# Patient Record
Sex: FEMALE | Race: WHITE | NOT HISPANIC OR LATINO | Employment: OTHER | URBAN - METROPOLITAN AREA
[De-identification: names, ages, dates, MRNs, and addresses within clinical notes are randomized per-mention and may not be internally consistent; named-entity substitution may affect disease eponyms.]

---

## 2018-02-10 ENCOUNTER — APPOINTMENT (OUTPATIENT)
Dept: RADIOLOGY | Facility: HOSPITAL | Age: 68
End: 2018-02-10
Attending: NURSE PRACTITIONER
Payer: MEDICARE

## 2018-02-10 ENCOUNTER — OFFICE VISIT (OUTPATIENT)
Dept: URGENT CARE | Facility: CLINIC | Age: 68
End: 2018-02-10
Payer: MEDICARE

## 2018-02-10 ENCOUNTER — TELEPHONE (OUTPATIENT)
Dept: URGENT CARE | Facility: CLINIC | Age: 68
End: 2018-02-10

## 2018-02-10 VITALS
DIASTOLIC BLOOD PRESSURE: 63 MMHG | TEMPERATURE: 98 F | HEART RATE: 63 BPM | BODY MASS INDEX: 37.37 KG/M2 | HEIGHT: 67 IN | OXYGEN SATURATION: 99 % | SYSTOLIC BLOOD PRESSURE: 137 MMHG | WEIGHT: 238.13 LBS

## 2018-02-10 DIAGNOSIS — M25.531 RIGHT WRIST PAIN: ICD-10-CM

## 2018-02-10 DIAGNOSIS — M25.531 RIGHT WRIST PAIN: Primary | ICD-10-CM

## 2018-02-10 PROCEDURE — 73110 X-RAY EXAM OF WRIST: CPT | Mod: 26,RT,, | Performed by: RADIOLOGY

## 2018-02-10 PROCEDURE — 99203 OFFICE O/P NEW LOW 30 MIN: CPT | Mod: PBBFAC,25,PN

## 2018-02-10 PROCEDURE — 73110 X-RAY EXAM OF WRIST: CPT | Mod: TC,PO,RT

## 2018-02-10 PROCEDURE — 73130 X-RAY EXAM OF HAND: CPT | Mod: 26,RT,, | Performed by: RADIOLOGY

## 2018-02-10 PROCEDURE — 1159F MED LIST DOCD IN RCRD: CPT | Mod: ,,, | Performed by: FAMILY MEDICINE

## 2018-02-10 PROCEDURE — 99999 PR PBB SHADOW E&M-NEW PATIENT-LVL III: CPT | Mod: PBBFAC,,,

## 2018-02-10 PROCEDURE — 99214 OFFICE O/P EST MOD 30 MIN: CPT | Mod: S$PBB,,, | Performed by: FAMILY MEDICINE

## 2018-02-10 PROCEDURE — 96372 THER/PROPH/DIAG INJ SC/IM: CPT | Mod: PBBFAC,PN

## 2018-02-10 PROCEDURE — 73130 X-RAY EXAM OF HAND: CPT | Mod: TC,PO,RT

## 2018-02-10 RX ORDER — PROPRANOLOL HYDROCHLORIDE 80 MG/1
80 CAPSULE, EXTENDED RELEASE ORAL DAILY
COMMUNITY

## 2018-02-10 RX ORDER — FLUOXETINE HYDROCHLORIDE 20 MG/1
20 CAPSULE ORAL DAILY
COMMUNITY

## 2018-02-10 RX ORDER — KETOROLAC TROMETHAMINE 10 MG/1
10 TABLET, FILM COATED ORAL EVERY 6 HOURS PRN
Qty: 16 TABLET | Refills: 0 | Status: SHIPPED | OUTPATIENT
Start: 2018-02-10 | End: 2018-02-14

## 2018-02-10 RX ORDER — KETOROLAC TROMETHAMINE 30 MG/ML
15 INJECTION, SOLUTION INTRAMUSCULAR; INTRAVENOUS
Status: DISCONTINUED | OUTPATIENT
Start: 2018-02-10 | End: 2018-02-10

## 2018-02-10 RX ORDER — KETOROLAC TROMETHAMINE 30 MG/ML
30 INJECTION, SOLUTION INTRAMUSCULAR; INTRAVENOUS
Status: COMPLETED | OUTPATIENT
Start: 2018-02-10 | End: 2018-02-10

## 2018-02-10 RX ADMIN — KETOROLAC TROMETHAMINE 30 MG: 30 INJECTION, SOLUTION INTRAMUSCULAR at 10:02

## 2018-02-10 NOTE — PROGRESS NOTES
"Subjective:       Patient ID: Sanam Rodriguez is a 67 y.o. female.    Chief Complaint: Wrist Pain    Patient is presenting with an acute onset ( 3 days) of right wrist pain.Denies an injury to area. OTC meds and ice did not resolve her pain. No decrease in ROM.      Review of Systems   Constitutional: Positive for activity change.   Cardiovascular: Negative.    Gastrointestinal: Negative.    Musculoskeletal: Positive for joint swelling.        Right wrist pain   Skin: Negative.    Psychiatric/Behavioral: Negative.        Objective:      /63   Pulse 63   Temp 98 °F (36.7 °C) (Tympanic)   Ht 5' 7" (1.702 m)   Wt 108 kg (238 lb 1.6 oz)   SpO2 99%   BMI 37.29 kg/m²   Physical Exam   Constitutional: She appears well-developed and well-nourished. She appears distressed.   Cardiovascular: Normal rate, regular rhythm, normal heart sounds and intact distal pulses.    Pulmonary/Chest: Effort normal and breath sounds normal.   Musculoskeletal:   Right wrist slight edema. Full ROM.    Neurological: She is alert.   Skin: Skin is warm and dry. Capillary refill takes less than 2 seconds.   Psychiatric: She has a normal mood and affect. Her behavior is normal.   Nursing note and vitals reviewed.      Assessment:       1. Right wrist pain        Plan:       Right wrist pain  -     X-Ray Hand 3 View Right; Future; Expected date: 02/10/2018  -     X-Ray Wrist Complete Right; Future; Expected date: 02/10/2018    Other orders  -     ketorolac (TORADOL) 10 mg tablet; Take 1 tablet (10 mg total) by mouth every 6 (six) hours as needed for Pain.  Dispense: 16 tablet; Refill: 0  -     Discontinue: ketorolac injection 15 mg; Inject 15 mg into the vein one time.  -     ketorolac injection 30 mg; Inject 1 mL (30 mg total) into the muscle one time.    X-rays did not indicate FX. Will sent for [facility]-Rad.  Right wrist demobilized   Patient to f/u with PCP or Ortho if no resolve by Monday  "

## 2018-07-05 ENCOUNTER — OFFICE VISIT (OUTPATIENT)
Dept: INTERNAL MEDICINE | Facility: CLINIC | Age: 68
End: 2018-07-05
Payer: MEDICARE

## 2018-07-05 VITALS
WEIGHT: 237.44 LBS | HEART RATE: 82 BPM | OXYGEN SATURATION: 98 % | HEIGHT: 67 IN | DIASTOLIC BLOOD PRESSURE: 56 MMHG | TEMPERATURE: 98 F | BODY MASS INDEX: 37.27 KG/M2 | SYSTOLIC BLOOD PRESSURE: 121 MMHG | RESPIRATION RATE: 18 BRPM

## 2018-07-05 DIAGNOSIS — H65.02 ACUTE SEROUS OTITIS MEDIA OF LEFT EAR, RECURRENCE NOT SPECIFIED: Primary | ICD-10-CM

## 2018-07-05 DIAGNOSIS — H60.322 ACUTE HEMORRHAGIC OTITIS EXTERNA OF LEFT EAR: ICD-10-CM

## 2018-07-05 PROCEDURE — 99213 OFFICE O/P EST LOW 20 MIN: CPT | Mod: PBBFAC,PN | Performed by: INTERNAL MEDICINE

## 2018-07-05 PROCEDURE — 99999 PR PBB SHADOW E&M-EST. PATIENT-LVL III: CPT | Mod: PBBFAC,,, | Performed by: INTERNAL MEDICINE

## 2018-07-05 PROCEDURE — 99213 OFFICE O/P EST LOW 20 MIN: CPT | Mod: S$PBB,,, | Performed by: INTERNAL MEDICINE

## 2018-07-05 RX ORDER — AMOXICILLIN 500 MG/1
500 TABLET, FILM COATED ORAL EVERY 8 HOURS
Qty: 21 TABLET | Refills: 0 | Status: SHIPPED | OUTPATIENT
Start: 2018-07-05 | End: 2018-07-12

## 2018-07-05 RX ORDER — NEOMYCIN SULFATE, POLYMYXIN B SULFATE AND HYDROCORTISONE 10; 3.5; 1 MG/ML; MG/ML; [USP'U]/ML
3 SUSPENSION/ DROPS AURICULAR (OTIC) 3 TIMES DAILY
Qty: 6 ML | Refills: 0 | Status: SHIPPED | OUTPATIENT
Start: 2018-07-05 | End: 2018-07-15

## 2018-07-05 NOTE — PROGRESS NOTES
Subjective:      Patient ID: Sanam Rodriguez is a 68 y.o. female.    Chief Complaint: No chief complaint on file.      HPI     Ms. Sanam Rodriguez is a patient of Mark Webster MD, who presents for left ear pain over the past 3 days and sore throat for the past 2 days. No cough. No sneezing. No f/c. +left sided facial pain. No tooth pain. She tried 2 Aleve this morning, which usually helps, but didn't in this case. Of note, she mentioned having accidentally scratched the left ear canal by accident last Friday. She applied Bactroban twice since.      Past Medical History:   Diagnosis Date    Hypertension      Past Surgical History:   Procedure Laterality Date    HYSTERECTOMY       Social History     Social History    Marital status: Unknown     Spouse name: N/A    Number of children: N/A    Years of education: N/A     Occupational History    Not on file.     Social History Main Topics    Smoking status: Never Smoker    Smokeless tobacco: Not on file    Alcohol use Not on file    Drug use: Unknown    Sexual activity: Not on file     Other Topics Concern    Not on file     Social History Narrative    No narrative on file     History reviewed. No pertinent family history.    Current Outpatient Prescriptions:     acetaminophen (TYLENOL) 500 MG tablet, Take 2 tablets (1,000 mg total) by mouth 3 (three) times daily as needed for Pain., Disp: 30 tablet, Rfl: 0    FLUoxetine (PROZAC) 20 MG capsule, Take 20 mg by mouth once daily., Disp: , Rfl:     ibuprofen (ADVIL,MOTRIN) 200 MG tablet, Take 2 tablets (400 mg total) by mouth every 6 (six) hours as needed for Pain., Disp: 30 tablet, Rfl: 0    propranolol (INDERAL LA) 80 MG 24 hr capsule, Take 80 mg by mouth once daily., Disp: , Rfl:     amoxicillin (AMOXIL) 500 MG Tab, Take 1 tablet (500 mg total) by mouth every 8 (eight) hours. for 7 days, Disp: 21 tablet, Rfl: 0    hydrocodone-acetaminophen 5-325mg (NORCO) 5-325 mg per tablet, Take 1 tablet by mouth  "every 4 (four) hours as needed for Pain., Disp: 10 tablet, Rfl: 0    neomycin-polymyxin-hydrocortisone (CORTISPORIN) 3.5-10,000-1 mg/mL-unit/mL-% otic suspension, Place 3 drops into the left ear 3 (three) times daily. for 10 days, Disp: 6 mL, Rfl: 0    Review of patient's allergies indicates:   Allergen Reactions    Sulfa (sulfonamide antibiotics)         Review of Systems   Negative    Objective:     BP (!) 121/56 (BP Location: Left arm, Patient Position: Sitting, BP Method: Large (Automatic))   Pulse 82   Temp 98 °F (36.7 °C) (Tympanic)   Resp 18   Ht 5' 7" (1.702 m)   Wt 107.7 kg (237 lb 7 oz)   SpO2 98%   BMI 37.19 kg/m²     Physical Exam  GEN: A&O fully, NAD  PSYC: Normal affect  HEENT: OP: left posterior pharynx erythematous, +shotty LAD in left submandibular region, no thyroid masses; R auditory canal & TM WNL; L auditory canal with scab and minor pus, left TM with opacification of inferior portion      Assessment:      1. Acute serous otitis media of left ear, recurrence not specified    2. Acute hemorrhagic otitis externa of left ear        Plan:   Acute serous otitis media of left ear, recurrence not specified  -     amoxicillin (AMOXIL) 500 MG Tab; Take 1 tablet (500 mg total) by mouth every 8 (eight) hours. for 7 days  Dispense: 21 tablet; Refill: 0    Acute hemorrhagic otitis externa of left ear  -     neomycin-polymyxin-hydrocortisone (CORTISPORIN) 3.5-10,000-1 mg/mL-unit/mL-% otic suspension; Place 3 drops into the left ear 3 (three) times daily. for 10 days  Dispense: 6 mL; Refill: 0        No Follow-up on file.  "

## 2025-04-02 ENCOUNTER — OFFICE VISIT (OUTPATIENT)
Dept: UROLOGY | Facility: CLINIC | Age: 75
End: 2025-04-02
Payer: MEDICARE

## 2025-04-02 ENCOUNTER — LAB VISIT (OUTPATIENT)
Dept: LAB | Facility: HOSPITAL | Age: 75
End: 2025-04-02
Attending: UROLOGY
Payer: MEDICARE

## 2025-04-02 VITALS
HEART RATE: 62 BPM | HEIGHT: 66 IN | SYSTOLIC BLOOD PRESSURE: 117 MMHG | WEIGHT: 249.31 LBS | DIASTOLIC BLOOD PRESSURE: 72 MMHG | BODY MASS INDEX: 40.07 KG/M2

## 2025-04-02 DIAGNOSIS — R31.29 OTHER MICROSCOPIC HEMATURIA: ICD-10-CM

## 2025-04-02 DIAGNOSIS — R10.9 RIGHT FLANK PAIN: ICD-10-CM

## 2025-04-02 DIAGNOSIS — R35.1 NOCTURIA: ICD-10-CM

## 2025-04-02 DIAGNOSIS — Z80.51 FAMILY HISTORY OF KIDNEY CANCER: ICD-10-CM

## 2025-04-02 DIAGNOSIS — R31.29 OTHER MICROSCOPIC HEMATURIA: Primary | ICD-10-CM

## 2025-04-02 DIAGNOSIS — N20.0 KIDNEY STONE: ICD-10-CM

## 2025-04-02 LAB
BILIRUB UR QL STRIP: NEGATIVE
CREAT SERPL-MCNC: 1.2 MG/DL (ref 0.5–1.4)
GFR SERPLBLD CREATININE-BSD FMLA CKD-EPI: 47 ML/MIN/1.73/M2
GLUCOSE UR QL STRIP: NEGATIVE
KETONES UR QL STRIP: NEGATIVE
LEUKOCYTE ESTERASE UR QL STRIP: NEGATIVE
PH, POC UA: 5.5
POC BLOOD, URINE: POSITIVE
POC NITRATES, URINE: NEGATIVE
POC RESIDUAL URINE VOLUME: 12 ML (ref 0–100)
PROT UR QL STRIP: POSITIVE
SP GR UR STRIP: 1.03 (ref 1–1.03)
UROBILINOGEN UR STRIP-ACNC: 0.2 (ref 0.1–1.1)

## 2025-04-02 PROCEDURE — 36415 COLL VENOUS BLD VENIPUNCTURE: CPT

## 2025-04-02 PROCEDURE — 82565 ASSAY OF CREATININE: CPT

## 2025-04-02 PROCEDURE — 99999 PR PBB SHADOW E&M-NEW PATIENT-LVL III: CPT | Mod: PBBFAC,,, | Performed by: UROLOGY

## 2025-04-02 RX ORDER — METFORMIN HYDROCHLORIDE 500 MG/1
1 TABLET ORAL
COMMUNITY

## 2025-04-02 RX ORDER — CARVEDILOL 6.25 MG/1
1 TABLET ORAL 2 TIMES DAILY
COMMUNITY

## 2025-04-02 RX ORDER — NAPROXEN SODIUM 220 MG/1
TABLET, FILM COATED ORAL
COMMUNITY

## 2025-04-02 RX ORDER — ROSUVASTATIN CALCIUM 20 MG/1
20 TABLET, COATED ORAL
COMMUNITY

## 2025-04-02 RX ORDER — SEMAGLUTIDE 0.68 MG/ML
0.5 INJECTION, SOLUTION SUBCUTANEOUS
COMMUNITY

## 2025-04-02 NOTE — PROGRESS NOTES
"Subjective:       Patient ID: Sanam Rodriguez is a 74 y.o. female.    Chief Complaint: No chief complaint on file.      History of Present Illness:     Ms Rodriguez has a small amount of microscopic hematuria today on 4-2-25.  No gross hematuria.  No dysuria.  Nocturia X 2-3.  She is not a smoker and she has no smoking history.  She takes aspirin 81 mg daily.  She had mild intermittent right flank pain for a few days last week that has resolved.  No left flank pain.  She has a left sided kidney stone.  She is a diabetic and she is on ozempic and metformin. She has a family history of kidney cancer in her sister.           Past Medical History:   Diagnosis Date    Hypertension      No family history on file.  Social History[1]  Encounter Medications[2]     Review of Systems   Constitutional:  Negative for chills and fever.   Respiratory:  Negative for shortness of breath.    Cardiovascular:  Negative for chest pain.   Gastrointestinal:  Negative for nausea and vomiting.   Genitourinary:  Positive for flank pain. Negative for difficulty urinating, dysuria and hematuria.   Musculoskeletal:  Negative for back pain.   Skin:  Negative for rash.   Neurological:  Negative for dizziness.   Psychiatric/Behavioral:  Negative for agitation.        Objective:     /72   Pulse 62   Ht 5' 5.75" (1.67 m)   Wt 113.1 kg (249 lb 5.4 oz)   BMI 40.55 kg/m²     Physical Exam  Constitutional:       General: She is not in acute distress.  Pulmonary:      Effort: Pulmonary effort is normal.   Abdominal:      General: There is no distension.      Palpations: Abdomen is soft.   Neurological:      Mental Status: She is alert and oriented to person, place, and time.         Office Visit on 04/02/2025   Component Date Value Ref Range Status    POC Residual Urine Volume 04/02/2025 12  0 - 100 mL Final    POC Blood, Urine 04/02/2025 Positive (A)  Negative Final    POC Bilirubin, Urine 04/02/2025 Negative  Negative Final    POC Urobilinogen, " Urine 04/02/2025 0.2  0.1 - 1.1 Final    POC Ketones, Urine 04/02/2025 Negative  Negative Final    POC Protein, Urine 04/02/2025 Positive (A)  Negative Final    POC Nitrates, Urine 04/02/2025 Negative  Negative Final    POC Glucose, Urine 04/02/2025 Negative  Negative Final    pH, UA 04/02/2025 5.5   Final    POC Specific Gravity, Urine 04/02/2025 1.030 (A)  1.003 - 1.029 Final    POC Leukocytes, Urine 04/02/2025 Negative  Negative Final        Results for orders placed or performed in visit on 04/02/25 (from the past 8760 hours)   POCT Bladder Scan   Result Value    POC Residual Urine Volume 12        Assessment:       1. Other microscopic hematuria    2. Kidney stone    3. Right flank pain    4. Nocturia    5. Family history of kidney cancer        Plan:     Orders Placed This Encounter    CT Urogram Abd Pelvis W WO    Creatinine, Serum    POCT Bladder Scan    POCT Urinalysis, Dipstick, Automated, W/O Scope          Assessment:  - Small amount of microscopic hematuria today on 4-2-25.  She is not a smoker and she has no smoking history.  She takes aspirin 81 mg daily.  - Mild intermittent right flank pain for a few days last week that has resolved.  - Left sided kidney stone.  - Nocturia.  - DM.  She is on ozempic and metformin.  - Family history of kidney cancer in her sister.    - CAD.  She had a coronary stent placed in 2021.    Plan:  - CT urogram followed by an office visit.  - Creatinine today.  - I discussed dietary modifications with her today and I recommended she drink mostly water during the day.  - I recommended she limit her fluid intake at night to small amounts of water and to void just prior to bedtime.                           [1]   Social History  Socioeconomic History    Marital status: Unknown   Tobacco Use    Smoking status: Never   Substance and Sexual Activity    Alcohol use: Not Currently    Drug use: Never    Sexual activity: Not Currently     Social Drivers of Health     Financial  Resource Strain: Patient Declined (2/13/2025)    Received from [facility]    Overall Financial Resource Strain (CARDIA)     Difficulty of Paying Living Expenses: Patient declined   Food Insecurity: Patient Declined (2/13/2025)    Received from [facility]    Hunger Vital Sign     Worried About Running Out of Food in the Last Year: Patient declined     Ran Out of Food in the Last Year: Patient declined   Transportation Needs: Patient Declined (2/13/2025)    Received from [facility]    PRAPARE - Transportation     Lack of Transportation (Medical): Patient declined     Lack of Transportation (Non-Medical): Patient declined   Physical Activity: Patient Declined (2/13/2025)    Received from [facility]    Exercise Vital Sign     Days of Exercise per Week: Patient declined     Minutes of Exercise per Session: Patient declined   Stress: Patient Declined (2/13/2025)    Received from [facility]    [employer] - Occupational Stress Questionnaire     Feeling of Stress : Patient declined   Housing Stability: Patient Declined (2/13/2025)    Received from [facility]    Housing Stability Vital Sign     Unable to Pay for Housing in the Last Year: Patient declined     Homeless in the Last Year: Patient declined   [2]   Outpatient Encounter Medications as of 4/2/2025   Medication Sig Dispense Refill    aspirin 81 MG Chew Take by mouth.      carvediloL (COREG) 6.25 MG tablet Take 1 tablet by mouth 2 (two) times daily.      FLUoxetine (PROZAC) 20 MG capsule Take 20 mg by mouth once daily.      metFORMIN (GLUCOPHAGE) 500 MG tablet Take 1 tablet by mouth.      OZEMPIC 0.25 mg or 0.5 mg (2 mg/3 mL) pen injector Inject 0.5 mg into the skin every 7 days.      propranolol (INDERAL LA) 80 MG 24 hr capsule Take 80 mg by mouth once daily.      rosuvastatin (CRESTOR) 20 MG tablet Take 20 mg by mouth.      acetaminophen (TYLENOL) 500 MG tablet Take 2 tablets (1,000 mg total) by mouth  3 (three) times daily as needed for Pain. 30 tablet 0    hydrocodone-acetaminophen 5-325mg (NORCO) 5-325 mg per tablet Take 1 tablet by mouth every 4 (four) hours as needed for Pain. 10 tablet 0    ibuprofen (ADVIL,MOTRIN) 200 MG tablet Take 2 tablets (400 mg total) by mouth every 6 (six) hours as needed for Pain. 30 tablet 0     No facility-administered encounter medications on file as of 4/2/2025.

## 2025-04-03 ENCOUNTER — RESULTS FOLLOW-UP (OUTPATIENT)
Dept: UROLOGY | Facility: CLINIC | Age: 75
End: 2025-04-03

## 2025-04-03 ENCOUNTER — TELEPHONE (OUTPATIENT)
Dept: UROLOGY | Facility: CLINIC | Age: 75
End: 2025-04-03
Payer: MEDICARE

## 2025-04-03 NOTE — TELEPHONE ENCOUNTER
I called pt and informed her that Dr. Lyles reviewed her GFR (which is a measurement of her kidney function) and it was low at 47 (60 or higher is considered normal). Encouraged her to drink mostly water. CT scan was moved up as per MD request.

## 2025-04-15 ENCOUNTER — PATIENT MESSAGE (OUTPATIENT)
Dept: NEUROLOGY | Facility: CLINIC | Age: 75
End: 2025-04-15
Payer: MEDICARE

## 2025-04-16 ENCOUNTER — HOSPITAL ENCOUNTER (OUTPATIENT)
Dept: RADIOLOGY | Facility: HOSPITAL | Age: 75
Discharge: HOME | End: 2025-04-16
Attending: UROLOGY
Payer: MEDICARE

## 2025-04-16 DIAGNOSIS — N20.0 KIDNEY STONE: ICD-10-CM

## 2025-04-16 DIAGNOSIS — R31.29 OTHER MICROSCOPIC HEMATURIA: ICD-10-CM

## 2025-04-16 DIAGNOSIS — R10.9 RIGHT FLANK PAIN: ICD-10-CM

## 2025-04-16 PROCEDURE — 25500020 PHARM REV CODE 255: Performed by: UROLOGY

## 2025-04-16 PROCEDURE — 74178 CT ABD&PLV WO CNTR FLWD CNTR: CPT | Mod: TC

## 2025-04-16 PROCEDURE — 74178 CT ABD&PLV WO CNTR FLWD CNTR: CPT | Mod: 26,,, | Performed by: RADIOLOGY

## 2025-04-16 RX ADMIN — IOHEXOL 75 ML: 350 INJECTION, SOLUTION INTRAVENOUS at 10:04

## 2025-04-24 ENCOUNTER — RESULTS FOLLOW-UP (OUTPATIENT)
Dept: UROLOGY | Facility: CLINIC | Age: 75
End: 2025-04-24

## 2025-04-24 ENCOUNTER — TELEPHONE (OUTPATIENT)
Dept: UROLOGY | Facility: CLINIC | Age: 75
End: 2025-04-24
Payer: MEDICARE

## 2025-04-24 ENCOUNTER — HOSPITAL ENCOUNTER (OUTPATIENT)
Dept: CARDIOLOGY | Facility: HOSPITAL | Age: 75
Discharge: HOME | End: 2025-04-24
Attending: UROLOGY
Payer: MEDICARE

## 2025-04-24 ENCOUNTER — HOSPITAL ENCOUNTER (OUTPATIENT)
Dept: RADIOLOGY | Facility: HOSPITAL | Age: 75
Discharge: HOME | End: 2025-04-24
Attending: UROLOGY
Payer: MEDICARE

## 2025-04-24 ENCOUNTER — OFFICE VISIT (OUTPATIENT)
Dept: UROLOGY | Facility: CLINIC | Age: 75
End: 2025-04-24
Payer: MEDICARE

## 2025-04-24 VITALS
HEIGHT: 66 IN | SYSTOLIC BLOOD PRESSURE: 139 MMHG | BODY MASS INDEX: 40.04 KG/M2 | HEART RATE: 69 BPM | WEIGHT: 249.13 LBS | DIASTOLIC BLOOD PRESSURE: 81 MMHG

## 2025-04-24 DIAGNOSIS — Z01.818 PREOP TESTING: ICD-10-CM

## 2025-04-24 DIAGNOSIS — N18.31 CHRONIC KIDNEY DISEASE (CKD) STAGE G3A/A1, MODERATELY DECREASED GLOMERULAR FILTRATION RATE (GFR) BETWEEN 45-59 ML/MIN/1.73 SQUARE METER AND ALBUMINURIA CREATININE RATIO LESS THAN 30 MG/G: ICD-10-CM

## 2025-04-24 DIAGNOSIS — N20.0 RENAL STONE: Primary | ICD-10-CM

## 2025-04-24 DIAGNOSIS — N20.0 RENAL STONE: ICD-10-CM

## 2025-04-24 DIAGNOSIS — R91.8 PULMONARY NODULES: ICD-10-CM

## 2025-04-24 DIAGNOSIS — R31.29 OTHER MICROSCOPIC HEMATURIA: ICD-10-CM

## 2025-04-24 LAB
BILIRUBIN, UA POC OHS: NEGATIVE
BLOOD, UA POC OHS: ABNORMAL
CLARITY, UA POC OHS: CLEAR
COLOR, UA POC OHS: YELLOW
GLUCOSE, UA POC OHS: NEGATIVE
KETONES, UA POC OHS: NEGATIVE
LEUKOCYTES, UA POC OHS: ABNORMAL
NITRITE, UA POC OHS: NEGATIVE
OHS QRS DURATION: 94 MS
OHS QTC CALCULATION: 442 MS
PH, UA POC OHS: 5.5
PROTEIN, UA POC OHS: 100
SPECIFIC GRAVITY, UA POC OHS: 1.02
UROBILINOGEN, UA POC OHS: 0.2

## 2025-04-24 PROCEDURE — 99999 PR PBB SHADOW E&M-EST. PATIENT-LVL V: CPT | Mod: PBBFAC,,, | Performed by: UROLOGY

## 2025-04-24 PROCEDURE — 71046 X-RAY EXAM CHEST 2 VIEWS: CPT | Mod: 26,,, | Performed by: STUDENT IN AN ORGANIZED HEALTH CARE EDUCATION/TRAINING PROGRAM

## 2025-04-24 PROCEDURE — 74018 RADEX ABDOMEN 1 VIEW: CPT | Mod: TC

## 2025-04-24 PROCEDURE — 74018 RADEX ABDOMEN 1 VIEW: CPT | Mod: 26,,, | Performed by: STUDENT IN AN ORGANIZED HEALTH CARE EDUCATION/TRAINING PROGRAM

## 2025-04-24 PROCEDURE — 93005 ELECTROCARDIOGRAM TRACING: CPT

## 2025-04-24 PROCEDURE — 71046 X-RAY EXAM CHEST 2 VIEWS: CPT | Mod: TC

## 2025-04-24 PROCEDURE — 93010 ELECTROCARDIOGRAM REPORT: CPT | Mod: ,,, | Performed by: INTERNAL MEDICINE

## 2025-04-24 NOTE — H&P (VIEW-ONLY)
Subjective:       Patient ID: Sanam Rodriguez is a 74 y.o. female.    Chief Complaint: Follow-up      History of Present Illness:     Ms Rodriguez has persistent microscopic hematuria.  Small amount of microscopic hematuria on 4-2-25.  Large amount of MH today on 4-24-25.  No gross hematuria.  Nocturia X 2-3.  Occasional daytime frequency and urgency.  She is not a smoker and she has no smoking history.  She takes aspirin 81 mg daily.  No current flank pain.    She underwent a CT urogram on 4-16-25 that showed CT urogram on 4-16-25 showed:  Kidneys/bladder: Punctate nonobstructive right renal calculi. Punctate lower pole left renal calculus with 18 mm staghorn like calculus within the mid to inferior renal pelvis. No significant hydronephrosis. Mild right renal pelvis and proximal ureter urothelial thickening and adjacent fat stranding. No significant perinephric stranding. More distal ureters demonstrate no evidence of urolithiasis, persistent filling defect or wall thickening. Bladder demonstrates circumferential wall thickening accentuated by incomplete distension.  Pancreas: Ill-defined cystic area at the dorsal pancreatic head measure approximate 15 mm.  Dedicated MRI abdomen/MRCP recommended. Innumerable small pulmonary nodules. Consider diagnostic CT chest for complete evaluation. CT urogram on 4-16-25 showed:  Kidneys/bladder: Punctate nonobstructive right renal calculi. Punctate lower pole left renal calculus with 18 mm staghorn like calculus within the mid to inferior renal pelvis. No significant hydronephrosis. Mild right renal pelvis and proximal ureter urothelial thickening and adjacent fat stranding. No significant perinephric stranding. More distal ureters demonstrate no evidence of urolithiasis, persistent filling defect or wall thickening. Bladder demonstrates circumferential wall thickening accentuated by incomplete distension.  Pancreas: Ill-defined cystic area at the dorsal pancreatic head measure  "approximate 15 mm.  Dedicated MRI abdomen/MRCP recommended. Innumerable small pulmonary nodules. Consider diagnostic CT chest for complete evaluation.          Past Medical History:   Diagnosis Date    Hypertension      No family history on file.  Social History[1]  Encounter Medications[2]     Review of Systems   Constitutional:  Negative for chills and fever.   Respiratory:  Negative for shortness of breath.    Cardiovascular:  Negative for chest pain.   Gastrointestinal:  Negative for nausea and vomiting.   Genitourinary:  Negative for difficulty urinating, dysuria, flank pain and hematuria.   Musculoskeletal:  Negative for back pain.   Skin:  Negative for rash.   Neurological:  Negative for dizziness.   Psychiatric/Behavioral:  Negative for agitation.        Objective:     /81   Pulse 69   Ht 5' 5.75" (1.67 m)   Wt 113 kg (249 lb 1.9 oz)   BMI 40.52 kg/m²     Physical Exam  Constitutional:       General: She is not in acute distress.  Cardiovascular:      Rate and Rhythm: Normal rate.   Pulmonary:      Effort: Pulmonary effort is normal.   Abdominal:      General: There is no distension.      Palpations: Abdomen is soft.   Neurological:      Mental Status: She is alert and oriented to person, place, and time.         Office Visit on 04/24/2025   Component Date Value Ref Range Status    Color, POC UA 04/24/2025 Yellow  Yellow, Straw, Colorless Final    Clarity, POC UA 04/24/2025 Clear  Clear Final    Glucose, POC UA 04/24/2025 Negative  Negative Final    Bilirubin, POC UA 04/24/2025 Negative  Negative Final    Ketones, POC UA 04/24/2025 Negative  Negative Final    Spec Grav POC UA 04/24/2025 1.025  1.005 - 1.030 Final    Blood, POC UA 04/24/2025 Large (A)  Negative Final    pH, POC UA 04/24/2025 5.5  5.0 - 8.0 Final    Protein, POC UA 04/24/2025 100 (A)  Negative Final    Urobilinogen, POC UA 04/24/2025 0.2  <=1.0 Final    Nitrite, POC UA 04/24/2025 Negative  Negative Final    WBC, POC UA 04/24/2025 " Small (A)  Negative Final        Results for orders placed or performed in visit on 04/02/25 (from the past 8760 hours)   POCT Bladder Scan   Result Value    POC Residual Urine Volume 12        Assessment:       1. Renal stone    2. Other microscopic hematuria    3. Pulmonary nodules    4. Chronic kidney disease (CKD) stage G3a/A1, moderately decreased glomerular filtration rate (GFR) between 45-59 mL/min/1.73 square meter and albuminuria creatinine ratio less than 30 mg/g    5. Preop testing        Plan:     Orders Placed This Encounter    X-Ray Chest PA And Lateral    X-Ray KUB    Comprehensive Metabolic Panel    CBC Auto Differential    POCT Urinalysis(Instrument)    EKG 12-lead        4-16-25  CT urogram.  See report.  Kidneys/bladder: Punctate nonobstructive right renal calculi. Punctate lower pole left renal calculus with 18 mm staghorn like calculus within the mid to inferior renal pelvis. No significant hydronephrosis. Mild right renal pelvis and proximal ureter urothelial thickening and adjacent fat stranding. No significant perinephric stranding. More distal ureters demonstrate no evidence of urolithiasis, persistent filling defect or wall thickening. Bladder demonstrates circumferential wall thickening accentuated by incomplete distension.  Pancreas: Ill-defined cystic area at the dorsal pancreatic head measure approximate 15 mm.  Dedicated MRI abdomen/MRCP recommended. Innumerable small pulmonary nodules. Consider diagnostic CT chest for complete evaluation.     I reviewed the above imaging results.         4-2-25  Cr 1.2.  GFR 47.    I reviewed the above lab results.         Assessment:  - CT urogram on 4-16-25 showed:  Kidneys/bladder: Punctate nonobstructive right renal calculi. Punctate lower pole left renal calculus with 18 mm staghorn like calculus within the mid to inferior renal pelvis. No significant hydronephrosis. Mild right renal pelvis and proximal ureter urothelial thickening and adjacent  fat stranding. No significant perinephric stranding. More distal ureters demonstrate no evidence of urolithiasis, persistent filling defect or wall thickening. Bladder demonstrates circumferential wall thickening accentuated by incomplete distension.  Pancreas: Ill-defined cystic area at the dorsal pancreatic head measure approximate 15 mm.  Dedicated MRI abdomen/MRCP recommended. Innumerable small pulmonary nodules. Consider diagnostic CT chest for complete evaluation.   - Persistent microscopic hematuria.  Small amount of microscopic hematuria on 4-2-25.  Large amount of MH today on 4-24-25.  She is not a smoker and she has no smoking history.  She takes aspirin 81 mg daily.  - Nocturia.  - DM.  She is on ozempic and metformin.  - Family history of kidney cancer in her sister.    - CAD.  She had a coronary stent placed in 2021.     Plan:  - I had a long discussion with the patient today regarding her CT scan finding of her large approximately 18 mm left renal pelvis stone.  I showed the patient the images of CT scan today.  I discussed management options with the patient such a left ESWL, or cystoscopy, left retrograde pyelogram, left ureteral stent placement, and an attempt at a left ureteroscopic stone extraction with laser lithotripsy.  I answered all of her questions to her apparent understanding and satisfaction.  The patient wants to proceed with an ESWL of her large left renal stone.  Risks and benefits of the surgery were explained to the patient today including the risk of failure of the surgery and/or the need for further surgeries in the near future and the patient expressed understanding.  All questions were answered by me to the patient's apparent understanding and to the patient's apparent satisfaction.  Surgery consent was signed today by the patient.    - I sent a message to my nurse today to fax a referral to her Cardiologist, Dr Bhargavi Saleem, for clearance to stop blood thinners such as aspirin  prior to her left ESWL of her left kidney stone that is scheduled for 5-2-2025 and for preop cardiac clearance.  - Will hold off on a cystoscopy at this time for her persistent microscopic hematuria.  - I discussed the option of starting her on a medication for her nocturia and she wants to hold off on starting on a medication at this time.  - I discussed dietary modifications with her today and I recommended she drink mostly water during the day.  - I recommended she limit her fluid intake at night to small amounts of water and to void just prior to bedtime.   - I recommended she follow up with a Gastroenterologist for her pancreatic cystic lesion.  - I discussed the option of referring her back to her Pulmonologist, Dr Anselmo Escobar, for her small multiple lung nodules and she wants to hold off on the referral at this time.                        [1]   Social History  Socioeconomic History    Marital status: Unknown   Tobacco Use    Smoking status: Never   Substance and Sexual Activity    Alcohol use: Not Currently    Drug use: Never    Sexual activity: Not Currently     Social Drivers of Health     Financial Resource Strain: Patient Declined (2/13/2025)    Received from [facility]    Overall Financial Resource Strain (CARDIA)     Difficulty of Paying Living Expenses: Patient declined   Food Insecurity: Patient Declined (2/13/2025)    Received from [facility]    Hunger Vital Sign     Worried About Running Out of Food in the Last Year: Patient declined     Ran Out of Food in the Last Year: Patient declined   Transportation Needs: Patient Declined (2/13/2025)    Received from [facility]    PRAPARE - Transportation     Lack of Transportation (Medical): Patient declined     Lack of Transportation (Non-Medical): Patient declined   Physical Activity: Patient Declined (2/13/2025)    Received from [facility]    Exercise Vital Sign     Days of Exercise per Week: Patient declined     Minutes of Exercise  per Session: Patient declined   Stress: Patient Declined (2/13/2025)    Received from [facility]    [employer] - Occupational Stress Questionnaire     Feeling of Stress : Patient declined   Housing Stability: Patient Declined (2/13/2025)    Received from [facility]    Housing Stability Vital Sign     Unable to Pay for Housing in the Last Year: Patient declined     Homeless in the Last Year: Patient declined   [2]   Outpatient Encounter Medications as of 4/24/2025   Medication Sig Dispense Refill    aspirin 81 MG Chew Take by mouth.      carvediloL (COREG) 6.25 MG tablet Take 1 tablet by mouth 2 (two) times daily.      FLUoxetine (PROZAC) 20 MG capsule Take 20 mg by mouth once daily.      metFORMIN (GLUCOPHAGE) 500 MG tablet Take 1 tablet by mouth.      OZEMPIC 0.25 mg or 0.5 mg (2 mg/3 mL) pen injector Inject 0.5 mg into the skin every 7 days.      propranolol (INDERAL LA) 80 MG 24 hr capsule Take 80 mg by mouth once daily.      rosuvastatin (CRESTOR) 20 MG tablet Take 20 mg by mouth.      acetaminophen (TYLENOL) 500 MG tablet Take 2 tablets (1,000 mg total) by mouth 3 (three) times daily as needed for Pain. 30 tablet 0    hydrocodone-acetaminophen 5-325mg (NORCO) 5-325 mg per tablet Take 1 tablet by mouth every 4 (four) hours as needed for Pain. 10 tablet 0    ibuprofen (ADVIL,MOTRIN) 200 MG tablet Take 2 tablets (400 mg total) by mouth every 6 (six) hours as needed for Pain. 30 tablet 0     No facility-administered encounter medications on file as of 4/24/2025.

## 2025-04-24 NOTE — PROGRESS NOTES
Subjective:       Patient ID: Sanam Rodriguez is a 74 y.o. female.    Chief Complaint: Follow-up      History of Present Illness:     Ms Rodriguez has persistent microscopic hematuria.  Small amount of microscopic hematuria on 4-2-25.  Large amount of MH today on 4-24-25.  No gross hematuria.  Nocturia X 2-3.  Occasional daytime frequency and urgency.  She is not a smoker and she has no smoking history.  She takes aspirin 81 mg daily.  No current flank pain.    She underwent a CT urogram on 4-16-25 that showed CT urogram on 4-16-25 showed:  Kidneys/bladder: Punctate nonobstructive right renal calculi. Punctate lower pole left renal calculus with 18 mm staghorn like calculus within the mid to inferior renal pelvis. No significant hydronephrosis. Mild right renal pelvis and proximal ureter urothelial thickening and adjacent fat stranding. No significant perinephric stranding. More distal ureters demonstrate no evidence of urolithiasis, persistent filling defect or wall thickening. Bladder demonstrates circumferential wall thickening accentuated by incomplete distension.  Pancreas: Ill-defined cystic area at the dorsal pancreatic head measure approximate 15 mm.  Dedicated MRI abdomen/MRCP recommended. Innumerable small pulmonary nodules. Consider diagnostic CT chest for complete evaluation. CT urogram on 4-16-25 showed:  Kidneys/bladder: Punctate nonobstructive right renal calculi. Punctate lower pole left renal calculus with 18 mm staghorn like calculus within the mid to inferior renal pelvis. No significant hydronephrosis. Mild right renal pelvis and proximal ureter urothelial thickening and adjacent fat stranding. No significant perinephric stranding. More distal ureters demonstrate no evidence of urolithiasis, persistent filling defect or wall thickening. Bladder demonstrates circumferential wall thickening accentuated by incomplete distension.  Pancreas: Ill-defined cystic area at the dorsal pancreatic head measure  "approximate 15 mm.  Dedicated MRI abdomen/MRCP recommended. Innumerable small pulmonary nodules. Consider diagnostic CT chest for complete evaluation.          Past Medical History:   Diagnosis Date    Hypertension      No family history on file.  Social History[1]  Encounter Medications[2]     Review of Systems   Constitutional:  Negative for chills and fever.   Respiratory:  Negative for shortness of breath.    Cardiovascular:  Negative for chest pain.   Gastrointestinal:  Negative for nausea and vomiting.   Genitourinary:  Negative for difficulty urinating, dysuria, flank pain and hematuria.   Musculoskeletal:  Negative for back pain.   Skin:  Negative for rash.   Neurological:  Negative for dizziness.   Psychiatric/Behavioral:  Negative for agitation.        Objective:     /81   Pulse 69   Ht 5' 5.75" (1.67 m)   Wt 113 kg (249 lb 1.9 oz)   BMI 40.52 kg/m²     Physical Exam  Constitutional:       General: She is not in acute distress.  Cardiovascular:      Rate and Rhythm: Normal rate.   Pulmonary:      Effort: Pulmonary effort is normal.   Abdominal:      General: There is no distension.      Palpations: Abdomen is soft.   Neurological:      Mental Status: She is alert and oriented to person, place, and time.         Office Visit on 04/24/2025   Component Date Value Ref Range Status    Color, POC UA 04/24/2025 Yellow  Yellow, Straw, Colorless Final    Clarity, POC UA 04/24/2025 Clear  Clear Final    Glucose, POC UA 04/24/2025 Negative  Negative Final    Bilirubin, POC UA 04/24/2025 Negative  Negative Final    Ketones, POC UA 04/24/2025 Negative  Negative Final    Spec Grav POC UA 04/24/2025 1.025  1.005 - 1.030 Final    Blood, POC UA 04/24/2025 Large (A)  Negative Final    pH, POC UA 04/24/2025 5.5  5.0 - 8.0 Final    Protein, POC UA 04/24/2025 100 (A)  Negative Final    Urobilinogen, POC UA 04/24/2025 0.2  <=1.0 Final    Nitrite, POC UA 04/24/2025 Negative  Negative Final    WBC, POC UA 04/24/2025 " Small (A)  Negative Final        Results for orders placed or performed in visit on 04/02/25 (from the past 8760 hours)   POCT Bladder Scan   Result Value    POC Residual Urine Volume 12        Assessment:       1. Renal stone    2. Other microscopic hematuria    3. Pulmonary nodules    4. Chronic kidney disease (CKD) stage G3a/A1, moderately decreased glomerular filtration rate (GFR) between 45-59 mL/min/1.73 square meter and albuminuria creatinine ratio less than 30 mg/g    5. Preop testing        Plan:     Orders Placed This Encounter    X-Ray Chest PA And Lateral    X-Ray KUB    Comprehensive Metabolic Panel    CBC Auto Differential    POCT Urinalysis(Instrument)    EKG 12-lead        4-16-25  CT urogram.  See report.  Kidneys/bladder: Punctate nonobstructive right renal calculi. Punctate lower pole left renal calculus with 18 mm staghorn like calculus within the mid to inferior renal pelvis. No significant hydronephrosis. Mild right renal pelvis and proximal ureter urothelial thickening and adjacent fat stranding. No significant perinephric stranding. More distal ureters demonstrate no evidence of urolithiasis, persistent filling defect or wall thickening. Bladder demonstrates circumferential wall thickening accentuated by incomplete distension.  Pancreas: Ill-defined cystic area at the dorsal pancreatic head measure approximate 15 mm.  Dedicated MRI abdomen/MRCP recommended. Innumerable small pulmonary nodules. Consider diagnostic CT chest for complete evaluation.     I reviewed the above imaging results.         4-2-25  Cr 1.2.  GFR 47.    I reviewed the above lab results.         Assessment:  - CT urogram on 4-16-25 showed:  Kidneys/bladder: Punctate nonobstructive right renal calculi. Punctate lower pole left renal calculus with 18 mm staghorn like calculus within the mid to inferior renal pelvis. No significant hydronephrosis. Mild right renal pelvis and proximal ureter urothelial thickening and adjacent  fat stranding. No significant perinephric stranding. More distal ureters demonstrate no evidence of urolithiasis, persistent filling defect or wall thickening. Bladder demonstrates circumferential wall thickening accentuated by incomplete distension.  Pancreas: Ill-defined cystic area at the dorsal pancreatic head measure approximate 15 mm.  Dedicated MRI abdomen/MRCP recommended. Innumerable small pulmonary nodules. Consider diagnostic CT chest for complete evaluation.   - Persistent microscopic hematuria.  Small amount of microscopic hematuria on 4-2-25.  Large amount of MH today on 4-24-25.  She is not a smoker and she has no smoking history.  She takes aspirin 81 mg daily.  - Nocturia.  - DM.  She is on ozempic and metformin.  - Family history of kidney cancer in her sister.    - CAD.  She had a coronary stent placed in 2021.     Plan:  - I had a long discussion with the patient today regarding her CT scan finding of her large approximately 18 mm left renal pelvis stone.  I showed the patient the images of CT scan today.  I discussed management options with the patient such a left ESWL, or cystoscopy, left retrograde pyelogram, left ureteral stent placement, and an attempt at a left ureteroscopic stone extraction with laser lithotripsy.  I answered all of her questions to her apparent understanding and satisfaction.  The patient wants to proceed with an ESWL of her large left renal stone.  Risks and benefits of the surgery were explained to the patient today including the risk of failure of the surgery and/or the need for further surgeries in the near future and the patient expressed understanding.  All questions were answered by me to the patient's apparent understanding and to the patient's apparent satisfaction.  Surgery consent was signed today by the patient.    - I sent a message to my nurse today to fax a referral to her Cardiologist, Dr Bhargavi Saleem, for clearance to stop blood thinners such as aspirin  prior to her left ESWL of her left kidney stone that is scheduled for 5-2-2025 and for preop cardiac clearance.  - Will hold off on a cystoscopy at this time for her persistent microscopic hematuria.  - I discussed the option of starting her on a medication for her nocturia and she wants to hold off on starting on a medication at this time.  - I discussed dietary modifications with her today and I recommended she drink mostly water during the day.  - I recommended she limit her fluid intake at night to small amounts of water and to void just prior to bedtime.   - I recommended she follow up with a Gastroenterologist for her pancreatic cystic lesion.  - I discussed the option of referring her back to her Pulmonologist, Dr Anselmo Escobar, for her small multiple lung nodules and she wants to hold off on the referral at this time.                        [1]   Social History  Socioeconomic History    Marital status: Unknown   Tobacco Use    Smoking status: Never   Substance and Sexual Activity    Alcohol use: Not Currently    Drug use: Never    Sexual activity: Not Currently     Social Drivers of Health     Financial Resource Strain: Patient Declined (2/13/2025)    Received from [facility]    Overall Financial Resource Strain (CARDIA)     Difficulty of Paying Living Expenses: Patient declined   Food Insecurity: Patient Declined (2/13/2025)    Received from [facility]    Hunger Vital Sign     Worried About Running Out of Food in the Last Year: Patient declined     Ran Out of Food in the Last Year: Patient declined   Transportation Needs: Patient Declined (2/13/2025)    Received from [facility]    PRAPARE - Transportation     Lack of Transportation (Medical): Patient declined     Lack of Transportation (Non-Medical): Patient declined   Physical Activity: Patient Declined (2/13/2025)    Received from [facility]    Exercise Vital Sign     Days of Exercise per Week: Patient declined     Minutes of Exercise  per Session: Patient declined   Stress: Patient Declined (2/13/2025)    Received from [facility]    [employer] - Occupational Stress Questionnaire     Feeling of Stress : Patient declined   Housing Stability: Patient Declined (2/13/2025)    Received from [facility]    Housing Stability Vital Sign     Unable to Pay for Housing in the Last Year: Patient declined     Homeless in the Last Year: Patient declined   [2]   Outpatient Encounter Medications as of 4/24/2025   Medication Sig Dispense Refill    aspirin 81 MG Chew Take by mouth.      carvediloL (COREG) 6.25 MG tablet Take 1 tablet by mouth 2 (two) times daily.      FLUoxetine (PROZAC) 20 MG capsule Take 20 mg by mouth once daily.      metFORMIN (GLUCOPHAGE) 500 MG tablet Take 1 tablet by mouth.      OZEMPIC 0.25 mg or 0.5 mg (2 mg/3 mL) pen injector Inject 0.5 mg into the skin every 7 days.      propranolol (INDERAL LA) 80 MG 24 hr capsule Take 80 mg by mouth once daily.      rosuvastatin (CRESTOR) 20 MG tablet Take 20 mg by mouth.      acetaminophen (TYLENOL) 500 MG tablet Take 2 tablets (1,000 mg total) by mouth 3 (three) times daily as needed for Pain. 30 tablet 0    hydrocodone-acetaminophen 5-325mg (NORCO) 5-325 mg per tablet Take 1 tablet by mouth every 4 (four) hours as needed for Pain. 10 tablet 0    ibuprofen (ADVIL,MOTRIN) 200 MG tablet Take 2 tablets (400 mg total) by mouth every 6 (six) hours as needed for Pain. 30 tablet 0     No facility-administered encounter medications on file as of 4/24/2025.

## 2025-04-24 NOTE — TELEPHONE ENCOUNTER
Request for cardiac clearance and recommendations on holding anticoags will be faxed to Dr. Saleem's office on tomorrow once preop tests have been resulted.

## 2025-04-25 ENCOUNTER — TELEPHONE (OUTPATIENT)
Facility: CLINIC | Age: 75
End: 2025-04-25
Payer: MEDICARE

## 2025-04-25 ENCOUNTER — TELEPHONE (OUTPATIENT)
Dept: UROLOGY | Facility: CLINIC | Age: 75
End: 2025-04-25
Payer: MEDICARE

## 2025-04-25 ENCOUNTER — PATIENT MESSAGE (OUTPATIENT)
Dept: UROLOGY | Facility: CLINIC | Age: 75
End: 2025-04-25
Payer: MEDICARE

## 2025-04-25 NOTE — TELEPHONE ENCOUNTER
Outgoing call returned to patient in regards to missed call. Answered pre op questions patient had, verbalized understanding. No further assistance required at this time.      ----- Message from Miriam sent at 4/25/2025  3:05 PM CDT -----  Type:  Patient Returning CallWho Called:Nii Watts Message for Patient:NurseDoes the patient know what this is regarding?:ProcedureWould the patient rather a call back or a response via [facility]? [facility] Call Back Number:[phone] Information: Missed call

## 2025-04-25 NOTE — TELEPHONE ENCOUNTER
.Outgoing call attempted to notify patient regarding below secure message from Dr. Lyles but no answer, left voice message with contact information letting patient know she can contact us at her earliest convenience for details and a [employer] message was sent as well.        Please call Ms Rodriguez and tell her that she needs to stop ozempic now and she needs to not take aspirin after this Friday. Her ESWL of her left kidney is scheduled for Friday 5-2-25 at [facility] at 8 am.

## 2025-04-25 NOTE — TELEPHONE ENCOUNTER
Returned patients call no answer [facility]         ----- Message from Konstantin sent at 4/25/2025 12:01 PM CDT -----  Contact: self  .Type:  Patient Returning CallWho Called:.Sanam Montanez Left Message for Patient:Does the patient know what this is regarding?:yesWould the patient rather a call back or a response via [facility]? Call [facility] Call Back Number:.[phone] Information: Pt states she missed a call and would like a call back.

## 2025-04-25 NOTE — TELEPHONE ENCOUNTER
Attempted to call Mrs. Rodriguez twice. And again no answer; called her daughter Tia and let her know that per Dr. Lyles Mrs. Rodriguez is to stop the Ozempic now and no more aspirin after today. Pt daughter stated that she will let Mrs. Rodriguez know.

## 2025-04-28 NOTE — PRE-PROCEDURE INSTRUCTIONS
Pre op instructions reviewed with Sanam over telephone, verbalized understanding.    Procedure Date: 5/2/25  Arrival Time:  TBD; We will call you after 2pm the day before your procedure with your arrival time.    Address:   [facility] ([facility], 2nd Building on the left)  [id number] [place] [address] [place] [address]  >>>Please enter through front entrance Lobby of 1st floor to Registration desk<<<    Testing/Clearances needed: Cardiac (Dr. Burk)      !!!INSTRUCTIONS IMPORTANT!!!  NO FOOD or tobacco products after midnight the night before surgery!     >>>MEDICATION INSTRUCTIONS<<<: Morning of Surgery, take small sip of water with ONLY these medications:  Carvedilol  Fluoxetine  Propranolol    *Blood Thinners: Stop taking Aspirin per Physician Instructions! Call your Surgeon office to inquire about any questions regarding your blood thinner medication.    *ADHD Medication: Stop taking ADHD/ADD medications 48 hrs prior to surgery, as this can affect the anesthesia used. Bariatric surgeries must HOLD 7 Days prior to surgery!    *Diabetic/ Prediabetic Patients: !!!If you take diabetic or weight loss medication, Do NOT take morning of surgery unless instructed by Doctor!!!  Metformin to be stopped 24 hrs prior to surgery.   Long Acting Insulin Instructions: HOLD the night before surgery unless instructed differently by Provider!  Ozempic/ Mounjaro/ Wegovy/ Trulicity/ Semaglutide injections or weight loss medication to be stopped 7 days prior to surgery.    !!!STOP ALL Aspirins, NSAIDS, WEIGHT LOSS INJECTIONS/PILLS, Herbal supplements, & Vitamins 7 DAYS BEFORE SURGERY!!!    ____  Avoid Alcoholic beverages 3 days prior to surgery, as it can thin the blood.  ____  NO Acrylic/fake nails or nail polish worn day of surgery (specifically hand/arm & foot surgeries).  ____  NO powder, lotions, deodorants, oils or cream on body.  ____  Remove all jewelry & piercings & foreign objects before arrival & leave  at home.  ____  Remove Dentures, Hearing Aids & Contact Lens prior to surgery.  ____  Bring photo ID and insurance information to hospital (Leave Valuables at Home).  ____  If going home the same day, arrange for a ride home. You will not be able to drive for 24 hrs if Anesthesia was used.   ____  Females (ages 11-60): may need to give a urine sample the morning of surgery; please see Pre op Nurse prior to using the restroom.  ____  Males: Stop ED medications (Viagra, Cialis) 24 hrs prior to surgery.  ____  Wear clean, loose fitting clothing to allow for dressings/ bandages.      Bathing Instructions:    -Shower with anti-bacterial Soap (Hibiclens or Dial) the night before surgery and the morning of.   -Do not use Hibiclens on your face or genitals.   -Apply clean clothes after shower.  -Do not shave your face or body 2 days prior to surgery unless instructed otherwise by your Surgeon.  [facility]/Ride Policy:  Only 2 adults allowed in pre op/recovery area during your procedure. You MUST HAVE A RIDE HOME from a responsible adult that you know and trust. Medical Transport, Uber or Lyft can ONLY be used if patient has a responsible adult to accompany them during ride home.       *Signs and symptoms of Infection Before or After Surgery:               !!!If you experience any fever, chills, nausea/ vomiting, foul odor/ excessive drainage from surgical site, flu-like symptoms, new wounds or cuts, PLEASE CALL THE SURGEON OFFICE at [phone] or SEND MESSAGE THROUGH [employer] PORTAL!!!     *If you are running late the morning of surgery, please call the Hospital Surgery Dept @ [phone].     *Billing questions:  [phone]     Thank you,  -[facility] Pre Admit Dept.  [phone] or [phone]  M-F 7:30 am-4:00 pm (Closed Major Holidays)

## 2025-04-29 ENCOUNTER — TELEPHONE (OUTPATIENT)
Dept: UROLOGY | Facility: CLINIC | Age: 75
End: 2025-04-29
Payer: MEDICARE

## 2025-04-29 ENCOUNTER — LAB VISIT (OUTPATIENT)
Dept: LAB | Facility: HOSPITAL | Age: 75
End: 2025-04-29
Attending: UROLOGY
Payer: MEDICARE

## 2025-04-29 DIAGNOSIS — Z01.818 PREOP TESTING: ICD-10-CM

## 2025-04-29 LAB
ALBUMIN SERPL BCP-MCNC: 3.4 G/DL (ref 3.5–5.2)
ALP SERPL-CCNC: 66 UNIT/L (ref 40–150)
ALT SERPL W/O P-5'-P-CCNC: 11 UNIT/L (ref 10–44)
ANION GAP (OHS): 7 MMOL/L (ref 8–16)
AST SERPL-CCNC: 13 UNIT/L (ref 11–45)
BILIRUB SERPL-MCNC: 0.5 MG/DL (ref 0.1–1)
BUN SERPL-MCNC: 21 MG/DL (ref 8–23)
CALCIUM SERPL-MCNC: 8.6 MG/DL (ref 8.7–10.5)
CHLORIDE SERPL-SCNC: 107 MMOL/L (ref 95–110)
CO2 SERPL-SCNC: 24 MMOL/L (ref 23–29)
CREAT SERPL-MCNC: 1 MG/DL (ref 0.5–1.4)
GFR SERPLBLD CREATININE-BSD FMLA CKD-EPI: 59 ML/MIN/1.73/M2
GLUCOSE SERPL-MCNC: 131 MG/DL (ref 70–110)
POTASSIUM SERPL-SCNC: 4.6 MMOL/L (ref 3.5–5.1)
PROT SERPL-MCNC: 6.8 GM/DL (ref 6–8.4)
SODIUM SERPL-SCNC: 138 MMOL/L (ref 136–145)

## 2025-04-29 PROCEDURE — 85025 COMPLETE CBC W/AUTO DIFF WBC: CPT

## 2025-04-29 PROCEDURE — 80053 COMPREHEN METABOLIC PANEL: CPT

## 2025-04-29 PROCEDURE — 36415 COLL VENOUS BLD VENIPUNCTURE: CPT | Mod: PN

## 2025-04-29 NOTE — TELEPHONE ENCOUNTER
Returned patients call no answer [facility]       ----- Message from Karen sent at 4/29/2025 12:00 PM CDT -----  Regarding: Sanam  Who Called: Nii Watts Message for Patient: Not sureDoes the patient know what this is regarding?: yesWould the patient rather a call back or a response via [facility]? callbackBe Call Back Number:.[phone] Information:

## 2025-04-30 LAB
ABSOLUTE EOSINOPHIL (OHS): 0.21 K/UL
ABSOLUTE MONOCYTE (OHS): 0.35 K/UL (ref 0.3–1)
ABSOLUTE NEUTROPHIL COUNT (OHS): 5.69 K/UL (ref 1.8–7.7)
BASOPHILS # BLD AUTO: 0.07 K/UL
BASOPHILS NFR BLD AUTO: 0.9 %
ERYTHROCYTE [DISTWIDTH] IN BLOOD BY AUTOMATED COUNT: 15.2 % (ref 11.5–14.5)
HCT VFR BLD AUTO: 38.2 % (ref 37–48.5)
HGB BLD-MCNC: 11.5 GM/DL (ref 12–16)
IMM GRANULOCYTES # BLD AUTO: 0.01 K/UL (ref 0–0.04)
IMM GRANULOCYTES NFR BLD AUTO: 0.1 % (ref 0–0.5)
LYMPHOCYTES # BLD AUTO: 1.35 K/UL (ref 1–4.8)
MCH RBC QN AUTO: 25.8 PG (ref 27–31)
MCHC RBC AUTO-ENTMCNC: 30.1 G/DL (ref 32–36)
MCV RBC AUTO: 86 FL (ref 82–98)
NUCLEATED RBC (/100WBC) (OHS): 0 /100 WBC
PLATELET # BLD AUTO: 231 K/UL (ref 150–450)
PMV BLD AUTO: 9.7 FL (ref 9.2–12.9)
RBC # BLD AUTO: 4.46 M/UL (ref 4–5.4)
RELATIVE EOSINOPHIL (OHS): 2.7 %
RELATIVE LYMPHOCYTE (OHS): 17.6 % (ref 18–48)
RELATIVE MONOCYTE (OHS): 4.6 % (ref 4–15)
RELATIVE NEUTROPHIL (OHS): 74.1 % (ref 38–73)
WBC # BLD AUTO: 7.68 K/UL (ref 3.9–12.7)

## 2025-05-01 ENCOUNTER — TELEPHONE (OUTPATIENT)
Dept: UROLOGY | Facility: CLINIC | Age: 75
End: 2025-05-01
Payer: MEDICARE

## 2025-05-01 ENCOUNTER — TELEPHONE (OUTPATIENT)
Dept: PREADMISSION TESTING | Facility: HOSPITAL | Age: 75
End: 2025-05-01
Payer: MEDICARE

## 2025-05-01 NOTE — PRE-PROCEDURE INSTRUCTIONS
Called and spoke with PATIENT about the following:     Please arrive to [facility] ([facility] at 6:30AM on 5/2/25 for your scheduled procedure.  Address: [address] [place] [address] [place] [address] (2nd Building on left, 1st Floor Lobby)    !!!NO FOOD OR DRINK after midnight!   Thank you,  -[facility] Pre [facility]  Mon-Fri 7:30 am - 4 pm [phone]

## 2025-05-01 NOTE — TELEPHONE ENCOUNTER
.Outgoing call placed to patient, patient verified name and date of birth, confirmed patient stopped her blood thinner and and GLP-1 drug, she confirmed and stated she also stopped the metformin, hospital confirmed and estimated time of procedure as documented. No further assistance needed.

## 2025-05-02 ENCOUNTER — HOSPITAL ENCOUNTER (OUTPATIENT)
Facility: HOSPITAL | Age: 75
Discharge: HOME | End: 2025-05-02
Attending: UROLOGY | Admitting: UROLOGY
Payer: MEDICARE

## 2025-05-02 ENCOUNTER — ANESTHESIA (OUTPATIENT)
Dept: SURGERY | Facility: HOSPITAL | Age: 75
End: 2025-05-02
Payer: MEDICARE

## 2025-05-02 ENCOUNTER — ANESTHESIA EVENT (OUTPATIENT)
Dept: SURGERY | Facility: HOSPITAL | Age: 75
End: 2025-05-02
Payer: MEDICARE

## 2025-05-02 VITALS
OXYGEN SATURATION: 98 % | HEIGHT: 66 IN | TEMPERATURE: 97 F | DIASTOLIC BLOOD PRESSURE: 60 MMHG | HEART RATE: 54 BPM | RESPIRATION RATE: 14 BRPM | BODY MASS INDEX: 40 KG/M2 | SYSTOLIC BLOOD PRESSURE: 131 MMHG | WEIGHT: 248.88 LBS

## 2025-05-02 DIAGNOSIS — N20.0 KIDNEY STONE: Primary | ICD-10-CM

## 2025-05-02 DIAGNOSIS — R52 PAIN: Primary | ICD-10-CM

## 2025-05-02 DIAGNOSIS — N20.0 RENAL STONE: ICD-10-CM

## 2025-05-02 LAB — POCT GLUCOSE: 168 MG/DL (ref 70–110)

## 2025-05-02 PROCEDURE — 36000704 HC OR TIME LEV I 1ST 15 MIN: Performed by: UROLOGY

## 2025-05-02 PROCEDURE — 63600175 PHARM REV CODE 636 W HCPCS: Performed by: UROLOGY

## 2025-05-02 PROCEDURE — 50590 FRAGMENTING OF KIDNEY STONE: CPT | Mod: LT,,, | Performed by: UROLOGY

## 2025-05-02 PROCEDURE — 71000033 HC RECOVERY, INTIAL HOUR: Performed by: UROLOGY

## 2025-05-02 PROCEDURE — 63600175 PHARM REV CODE 636 W HCPCS: Performed by: NURSE ANESTHETIST, CERTIFIED REGISTERED

## 2025-05-02 PROCEDURE — 37000008 HC ANESTHESIA 1ST 15 MINUTES: Performed by: UROLOGY

## 2025-05-02 PROCEDURE — 37000009 HC ANESTHESIA EA ADD 15 MINS: Performed by: UROLOGY

## 2025-05-02 PROCEDURE — 36000705 HC OR TIME LEV I EA ADD 15 MIN: Performed by: UROLOGY

## 2025-05-02 PROCEDURE — 25000003 PHARM REV CODE 250: Performed by: NURSE ANESTHETIST, CERTIFIED REGISTERED

## 2025-05-02 PROCEDURE — 71000015 HC POSTOP RECOV 1ST HR: Performed by: UROLOGY

## 2025-05-02 RX ORDER — OXYCODONE AND ACETAMINOPHEN 5; 325 MG/1; MG/1
1 TABLET ORAL
Status: DISCONTINUED | OUTPATIENT
Start: 2025-05-02 | End: 2025-05-02 | Stop reason: HOSPADM

## 2025-05-02 RX ORDER — DEXAMETHASONE SODIUM PHOSPHATE 4 MG/ML
INJECTION, SOLUTION INTRA-ARTICULAR; INTRALESIONAL; INTRAMUSCULAR; INTRAVENOUS; SOFT TISSUE
Status: DISCONTINUED | OUTPATIENT
Start: 2025-05-02 | End: 2025-05-02

## 2025-05-02 RX ORDER — ONDANSETRON HYDROCHLORIDE 2 MG/ML
4 INJECTION, SOLUTION INTRAVENOUS ONCE AS NEEDED
Status: DISCONTINUED | OUTPATIENT
Start: 2025-05-02 | End: 2025-05-02 | Stop reason: HOSPADM

## 2025-05-02 RX ORDER — FENTANYL CITRATE 50 UG/ML
25 INJECTION, SOLUTION INTRAMUSCULAR; INTRAVENOUS EVERY 5 MIN PRN
Status: DISCONTINUED | OUTPATIENT
Start: 2025-05-02 | End: 2025-05-02 | Stop reason: HOSPADM

## 2025-05-02 RX ORDER — ONDANSETRON HYDROCHLORIDE 2 MG/ML
INJECTION, SOLUTION INTRAVENOUS
Status: DISCONTINUED | OUTPATIENT
Start: 2025-05-02 | End: 2025-05-02

## 2025-05-02 RX ORDER — ONDANSETRON HYDROCHLORIDE 2 MG/ML
4 INJECTION, SOLUTION INTRAVENOUS DAILY PRN
Status: DISCONTINUED | OUTPATIENT
Start: 2025-05-02 | End: 2025-05-02 | Stop reason: HOSPADM

## 2025-05-02 RX ORDER — PROPOFOL 10 MG/ML
VIAL (ML) INTRAVENOUS
Status: DISCONTINUED | OUTPATIENT
Start: 2025-05-02 | End: 2025-05-02

## 2025-05-02 RX ORDER — LIDOCAINE HYDROCHLORIDE 20 MG/ML
INJECTION INTRAVENOUS
Status: DISCONTINUED | OUTPATIENT
Start: 2025-05-02 | End: 2025-05-02

## 2025-05-02 RX ORDER — EPHEDRINE SULFATE 50 MG/ML
INJECTION, SOLUTION INTRAVENOUS
Status: DISCONTINUED | OUTPATIENT
Start: 2025-05-02 | End: 2025-05-02

## 2025-05-02 RX ORDER — TAMSULOSIN HYDROCHLORIDE 0.4 MG/1
0.4 CAPSULE ORAL 2 TIMES DAILY
Qty: 60 CAPSULE | Refills: 1 | Status: SHIPPED | OUTPATIENT
Start: 2025-05-02 | End: 2026-05-02

## 2025-05-02 RX ORDER — HYDROCODONE BITARTRATE AND ACETAMINOPHEN 5; 325 MG/1; MG/1
1 TABLET ORAL EVERY 6 HOURS PRN
Qty: 30 TABLET | Refills: 0 | Status: SHIPPED | OUTPATIENT
Start: 2025-05-02

## 2025-05-02 RX ORDER — CEFTRIAXONE 1 G/1
2 INJECTION, POWDER, FOR SOLUTION INTRAMUSCULAR; INTRAVENOUS
Status: COMPLETED | OUTPATIENT
Start: 2025-05-02 | End: 2025-05-02

## 2025-05-02 RX ADMIN — EPHEDRINE SULFATE 10 MG: 50 INJECTION INTRAVENOUS at 08:05

## 2025-05-02 RX ADMIN — DEXAMETHASONE SODIUM PHOSPHATE 4 MG: 4 INJECTION, SOLUTION INTRA-ARTICULAR; INTRALESIONAL; INTRAMUSCULAR; INTRAVENOUS; SOFT TISSUE at 08:05

## 2025-05-02 RX ADMIN — CEFTRIAXONE SODIUM 2 G: 1 INJECTION, POWDER, FOR SOLUTION INTRAMUSCULAR; INTRAVENOUS at 08:05

## 2025-05-02 RX ADMIN — SODIUM CHLORIDE, POTASSIUM CHLORIDE, SODIUM LACTATE AND CALCIUM CHLORIDE: 600; 310; 30; 20 INJECTION, SOLUTION INTRAVENOUS at 08:05

## 2025-05-02 RX ADMIN — PROPOFOL 150 MG: 10 INJECTION, EMULSION INTRAVENOUS at 08:05

## 2025-05-02 RX ADMIN — ONDANSETRON 4 MG: 2 INJECTION INTRAMUSCULAR; INTRAVENOUS at 08:05

## 2025-05-02 RX ADMIN — EPHEDRINE SULFATE 5 MG: 50 INJECTION INTRAVENOUS at 08:05

## 2025-05-02 RX ADMIN — LIDOCAINE HYDROCHLORIDE 100 MG: 20 INJECTION INTRAVENOUS at 08:05

## 2025-05-02 NOTE — ANESTHESIA PREPROCEDURE EVALUATION
05/02/2025  Sanam Rodriguez is a 74 y.o., female.      Pre-op Assessment    I have reviewed the Patient Summary Reports.     I have reviewed the Nursing Notes. I have reviewed the NPO Status.      Review of Systems  Anesthesia Hx:  No problems with previous Anesthesia                Hematology/Oncology:  Hematology Normal   Oncology Normal                                   Cardiovascular:     Hypertension   CAD   CABG/stent (stent only, doing well per pt)                                       Renal/:  Renal/ Normal                 Hepatic/GI:  Hepatic/GI Normal                    Neurological:  Neurology Normal                                      Endocrine:  Endocrine Normal            Dermatological:  Skin Normal    Psych:  Psychiatric Normal                    Physical Exam  General: Well nourished, Cooperative, Alert and Oriented    Airway:  Mallampati: II / II  Mouth Opening: Normal  TM Distance: Normal  Tongue: Normal  Neck ROM: Normal ROM    Dental:  Intact            Anesthesia Plan  Type of Anesthesia, risks & benefits discussed:    Anesthesia Type: Gen ETT, MAC  Intra-op Monitoring Plan: Standard ASA Monitors  Post Op Pain Control Plan: multimodal analgesia  Induction:  IV  Airway Plan: Direct  Informed Consent: Informed consent signed with the Patient and all parties understand the risks and agree with anesthesia plan.  All questions answered.   ASA Score: 3  Day of Surgery Review of History & Physical: H&P Update referred to the surgeon/provider.I have interviewed and examined the patient. I have reviewed the patient's H&P dated: There are no significant changes. H&P completed by Anesthesiologist.  Anesthesia Plan Notes: Bridge on bottom R loose    Ready For Surgery From Anesthesia Perspective.     .

## 2025-05-02 NOTE — ANESTHESIA POSTPROCEDURE EVALUATION
Anesthesia Post Evaluation    Patient: Sanam Rodriguez    Procedure(s) Performed: Procedure(s) (LRB):  LITHOTRIPSY, ESWL (Left)    Final Anesthesia Type: general      Patient location during evaluation: PACU  Patient participation: Yes- Able to Participate  Level of consciousness: awake and alert  Post-procedure vital signs: reviewed and stable  Pain management: adequate  Airway patency: patent  NIC mitigation strategies: Extubation while patient is awake  PONV status at discharge: No PONV  Anesthetic complications: no      Cardiovascular status: hemodynamically stable  Respiratory status: spontaneous ventilation  Hydration status: euvolemic  Follow-up not needed.              Vitals Value Taken Time   /60 05/02/25 09:31   Temp 36.3 °C (97.3 °F) 05/02/25 09:15   Pulse 53 05/02/25 09:34   Resp 50 05/02/25 09:34   SpO2 97 % 05/02/25 09:34   Vitals shown include unfiled device data.      Event Time   Out of Recovery 09:36:51         Pain/Ventrua Score: Ventura Score: 10 (5/2/2025  9:42 AM)

## 2025-05-02 NOTE — ANESTHESIA PROCEDURE NOTES
Intubation    Date/Time: 5/2/2025 8:23 AM    Performed by: Chele Kendrick CRNA  Authorized by: Jessica Ko MD    Intubation:     Induction:  Intravenous    Intubated:  Postinduction    Mask Ventilation:  Not attempted    Attempts:  1    Attempted By:  Student    Difficult Airway Encountered?: No      Complications:  None    Airway Device:  Supraglottic airway/LMA (igel)    Airway Device Size:  5.0    Secured at:  The lips    Placement Verified By:  Capnometry    Complicating Factors:  None    Findings Post-Intubation:  BS equal bilateral and atraumatic/condition of teeth unchanged

## 2025-05-02 NOTE — OP NOTE
Surgeon:  Dr Nando Lyles.    Date:  5-2-2025.    Pre operative diagnosis:  Left renal stone.      Post operative diagnosis:  Left renal stone.      Surgery:  Extracorporeal shock wave lithotripsy of her left renal stone.    Anesthesia:  General.    Estimated blood loss:  None.    Complications:  None.    Specimens:  None.    Procedure in details:  Risks and benefits of the surgery was explained to the patient prior to the surgery and the patient expressed understanding.  All questions were answered by me to the patient's apparent understanding and to the patient's apparent satisfaction.  Surgery consent was signed by the patient prior to the surgery.  The patient was taken to the operating room and placed in the supine position.  Anesthesia was administered by the Anesthesia team.  An IV antibiotic was given to the patient prior to the surgery.  A timeout was done prior to the surgery.  The shock head was inflated and lubricated and placed over her left flank.  The patient's large left renal pelvis stone was visualized under fluoroscopy.  The patient's left renal stone was aligned in the cross hairs of the AP and the oblique view.  Lithotripsy was started at a low power and was increased to the maximum power per protocol.  Lithotripsy was performed of her left renal stone at 90 shocks per minute with a total of 2500 shocks.  There was good fragmentation of her left renal stone.  A post operative timeout was done at the end of the procedure.  The patient was taken to the recovery room in stable condition at the end of the procedure.

## 2025-05-12 ENCOUNTER — TELEPHONE (OUTPATIENT)
Dept: UROLOGY | Facility: CLINIC | Age: 75
End: 2025-05-12
Payer: MEDICARE

## 2025-05-20 ENCOUNTER — HOSPITAL ENCOUNTER (OUTPATIENT)
Dept: RADIOLOGY | Facility: HOSPITAL | Age: 75
Discharge: HOME | End: 2025-05-20
Attending: UROLOGY
Payer: MEDICARE

## 2025-05-20 ENCOUNTER — TELEPHONE (OUTPATIENT)
Dept: UROLOGY | Facility: CLINIC | Age: 75
End: 2025-05-20
Payer: MEDICARE

## 2025-05-20 ENCOUNTER — PATIENT MESSAGE (OUTPATIENT)
Dept: UROLOGY | Facility: CLINIC | Age: 75
End: 2025-05-20
Payer: MEDICARE

## 2025-05-20 DIAGNOSIS — N20.0 KIDNEY STONE: ICD-10-CM

## 2025-05-20 PROCEDURE — 76770 US EXAM ABDO BACK WALL COMP: CPT | Mod: 26,,, | Performed by: RADIOLOGY

## 2025-05-20 PROCEDURE — 76770 US EXAM ABDO BACK WALL COMP: CPT | Mod: TC

## 2025-05-20 NOTE — TELEPHONE ENCOUNTER
.Outgoing call attempted to notify patient regarding below but no answer, left voice message with contact information letting patient know she can contact us at her earliest convenience so we can get her scheduled as recommended by the provider.      ----- Message from Nando Lyles MD sent at 5/20/2025  1:22 PM CDT -----  I sent a secure chat about this as well.  I did a ESWL on this patient 3 weeks ago and somehow she did not get a follow up appointment with me.  She got a renal ultrasound done today but she has not gotten the KUB done.  She needs a KUB done as soon as possible and she needs an appointment to see me on Thursday 5-22-25.

## 2025-05-21 ENCOUNTER — TELEPHONE (OUTPATIENT)
Dept: UROLOGY | Facility: CLINIC | Age: 75
End: 2025-05-21
Payer: MEDICARE

## 2025-05-21 NOTE — TELEPHONE ENCOUNTER
.Outgoing call attempted to notify patient regarding scheduling her KUB as ordered but no answer, left voice message with contact information letting patient know she can contact us at her earliest convenience for assistance.

## 2025-05-21 NOTE — TELEPHONE ENCOUNTER
.Outgoing call placed to patient, patient verified name and date of birth, patient stated that she cannot do the KUB on today but she is willing to do it on tomorrow before her appointment, imaging scheduled as requested.    ----- Message from Namrata sent at 5/21/2025  8:17 AM CDT -----  Contact: Sanam  .Type:  Patient Returning CallWho Called:Sanam Who Left Message for Patient:nurse Does the patient know what this is regarding?:appt Would the patient rather a call back or a response via [facility]? Call Best Call Back Number:.[phone] Additional Information: Pt requesting a call back    Ms Rodriguez needs to get the KUB that I ordered done today. She has a post op appointment to see me tomorrow.

## 2025-05-22 ENCOUNTER — OFFICE VISIT (OUTPATIENT)
Dept: UROLOGY | Facility: CLINIC | Age: 75
End: 2025-05-22
Payer: MEDICARE

## 2025-05-22 ENCOUNTER — HOSPITAL ENCOUNTER (OUTPATIENT)
Dept: RADIOLOGY | Facility: HOSPITAL | Age: 75
Discharge: HOME | End: 2025-05-22
Attending: UROLOGY
Payer: MEDICARE

## 2025-05-22 VITALS
DIASTOLIC BLOOD PRESSURE: 62 MMHG | BODY MASS INDEX: 40 KG/M2 | WEIGHT: 248.88 LBS | HEIGHT: 66 IN | HEART RATE: 69 BPM | SYSTOLIC BLOOD PRESSURE: 104 MMHG

## 2025-05-22 DIAGNOSIS — R91.8 PULMONARY NODULES: ICD-10-CM

## 2025-05-22 DIAGNOSIS — R31.29 OTHER MICROSCOPIC HEMATURIA: Primary | ICD-10-CM

## 2025-05-22 DIAGNOSIS — N20.0 RENAL STONE: ICD-10-CM

## 2025-05-22 DIAGNOSIS — N20.0 KIDNEY STONE: ICD-10-CM

## 2025-05-22 LAB
BILIRUBIN, UA POC OHS: NEGATIVE
BLOOD, UA POC OHS: ABNORMAL
CLARITY, UA POC OHS: CLEAR
COLOR, UA POC OHS: YELLOW
GLUCOSE, UA POC OHS: NEGATIVE
KETONES, UA POC OHS: NEGATIVE
LEUKOCYTES, UA POC OHS: ABNORMAL
NITRITE, UA POC OHS: NEGATIVE
PH, UA POC OHS: 8.5
PROTEIN, UA POC OHS: NEGATIVE
SPECIFIC GRAVITY, UA POC OHS: 1.01
UROBILINOGEN, UA POC OHS: 0.2

## 2025-05-22 PROCEDURE — 74018 RADEX ABDOMEN 1 VIEW: CPT | Mod: TC

## 2025-05-22 PROCEDURE — 3074F SYST BP LT 130 MM HG: CPT | Mod: CPTII,S$GLB,, | Performed by: UROLOGY

## 2025-05-22 PROCEDURE — 81003 URINALYSIS AUTO W/O SCOPE: CPT | Mod: QW,S$GLB,, | Performed by: UROLOGY

## 2025-05-22 PROCEDURE — 99213 OFFICE O/P EST LOW 20 MIN: CPT | Mod: 24,S$GLB,, | Performed by: UROLOGY

## 2025-05-22 PROCEDURE — 74018 RADEX ABDOMEN 1 VIEW: CPT | Mod: 26,,, | Performed by: RADIOLOGY

## 2025-05-22 PROCEDURE — 3078F DIAST BP <80 MM HG: CPT | Mod: CPTII,S$GLB,, | Performed by: UROLOGY

## 2025-05-22 PROCEDURE — 3288F FALL RISK ASSESSMENT DOCD: CPT | Mod: CPTII,S$GLB,, | Performed by: UROLOGY

## 2025-05-22 PROCEDURE — 1125F AMNT PAIN NOTED PAIN PRSNT: CPT | Mod: CPTII,S$GLB,, | Performed by: UROLOGY

## 2025-05-22 PROCEDURE — 1159F MED LIST DOCD IN RCRD: CPT | Mod: CPTII,S$GLB,, | Performed by: UROLOGY

## 2025-05-22 PROCEDURE — 99999 PR PBB SHADOW E&M-EST. PATIENT-LVL IV: CPT | Mod: PBBFAC,,, | Performed by: UROLOGY

## 2025-05-22 PROCEDURE — 1101F PT FALLS ASSESS-DOCD LE1/YR: CPT | Mod: CPTII,S$GLB,, | Performed by: UROLOGY

## 2025-05-22 PROCEDURE — 82365 CALCULUS SPECTROSCOPY: CPT | Performed by: UROLOGY

## 2025-05-22 NOTE — PROGRESS NOTES
"Subjective:       Patient ID: Sanam Rodriguez is a 75 y.o. female.    Chief Complaint: Post-op Evaluation      History of Present Illness:     Ms Rodriguez has persistent microscopic hematuria.  Small amount of microscopic hematuria today on 5-22-25.  No gross hematuria.  She is not a smoker and she has no smoking history.  She takes aspirin 81 mg daily.    She had a 1.8 cm left renal pelvis stone s/p left ESWL on 5-2-25.  She says that she has passed a lot of stone fragments that she brought to the office today.  KUB today on 5-22-25 did not show any kidney or ureteral stones.  She says that she has only had to take 2 of the norco 5-325 mg tablets that I prescribed her.  No flank pain.           Past Medical History:   Diagnosis Date    Hypertension      No family history on file.  Social History[1]  Encounter Medications[2]     Review of Systems   Constitutional:  Negative for chills and fever.   Respiratory:  Negative for shortness of breath.    Cardiovascular:  Negative for chest pain.   Gastrointestinal:  Negative for nausea and vomiting.   Genitourinary:  Negative for difficulty urinating, dysuria, flank pain and hematuria.   Musculoskeletal:  Negative for back pain.   Skin:  Negative for rash.   Neurological:  Negative for dizziness.   Psychiatric/Behavioral:  Negative for agitation.        Objective:     /62   Pulse 69   Ht 5' 5.75" (1.67 m)   Wt 112.9 kg (248 lb 14.4 oz)   BMI 40.48 kg/m²     Physical Exam  Constitutional:       General: She is not in acute distress.  Pulmonary:      Effort: Pulmonary effort is normal.   Abdominal:      General: There is no distension.      Palpations: Abdomen is soft.   Neurological:      Mental Status: She is alert and oriented to person, place, and time.         Office Visit on 05/22/2025   Component Date Value Ref Range Status    Color, POC UA 05/22/2025 Yellow  Yellow, Straw, Colorless Final    Clarity, POC UA 05/22/2025 Clear  Clear Final    Glucose, POC UA " 05/22/2025 Negative  Negative Final    Bilirubin, POC UA 05/22/2025 Negative  Negative Final    Ketones, POC UA 05/22/2025 Negative  Negative Final    Spec Grav POC UA 05/22/2025 1.010  1.005 - 1.030 Final    Blood, POC UA 05/22/2025 Small (A)  Negative Final    pH, POC UA 05/22/2025 8.5  5.0 - 8.0 Final    Protein, POC UA 05/22/2025 Negative  Negative Final    Urobilinogen, POC UA 05/22/2025 0.2  <=1.0 Final    Nitrite, POC UA 05/22/2025 Negative  Negative Final    WBC, POC UA 05/22/2025 Trace (A)  Negative Final        Results for orders placed or performed in visit on 04/02/25 (from the past 8760 hours)   POCT Bladder Scan   Result Value    POC Residual Urine Volume 12        Assessment:       1. Other microscopic hematuria    2. Pulmonary nodules    3. Renal stone        Plan:     Orders Placed This Encounter    X-Ray KUB    US Retroperitoneal Complete    Urinary Stone Analysis    POCT Urinalysis(Instrument)           4-16-25  CT urogram.  See report.  Kidneys/bladder: Punctate nonobstructive right renal calculi. Punctate lower pole left renal calculus with 18 mm staghorn like calculus within the mid to inferior renal pelvis. No significant hydronephrosis. Mild right renal pelvis and proximal ureter urothelial thickening and adjacent fat stranding. No significant perinephric stranding. More distal ureters demonstrate no evidence of urolithiasis, persistent filling defect or wall thickening. Bladder demonstrates circumferential wall thickening accentuated by incomplete distension.  Pancreas: Ill-defined cystic area at the dorsal pancreatic head measure approximate 15 mm.  Dedicated MRI abdomen/MRCP recommended. Innumerable small pulmonary nodules. Consider diagnostic CT chest for complete evaluation.      I reviewed the above imaging results.            4-2-25  Cr 1.2.  GFR 47.     I reviewed the above lab results.            Assessment:  - Persistent microscopic hematuria.  Small amount of microscopic hematuria  today on 5-22-25.  She is not a smoker and she has no smoking history.  She takes aspirin 81 mg daily.  - 1.8 cm left renal pelvis stone s/p left ESWL on 5-2-25.  She says that she has passed a lot of stone fragments that she brought to the office today.  KUB today on 5-22-25 did not show any kidney or ureteral stones.  She says that she has only had to take 2 of the norco 5-325 mg tablets that I prescribed her.  - Nocturia.  - DM.  She is on ozempic and metformin.  - Family history of kidney cancer in her sister.    - CAD.  She had a coronary stent placed in 2021.     Plan:  - Continue with observation of her small amount of microscopic hematuria at this time.   - Urinary stone analysis today.  - I discussed dietary modifications with her today and I recommended she drink mostly water during the day.  - I recommended she limit her fluid intake at night to small amounts of water and to void just prior to bedtime.   - I recommended she follow up with a Gastroenterologist for her pancreatic cystic lesion.  - I discussed the option of referring her back to her Pulmonologist, Dr Anselmo Escobar, for her small multiple lung nodules and she wants to hold off on the referral at this time.   - Renal ultrasound and KUB both in 4 months a few days prior to a 4 month appointment.  - RTC in 4 months or sooner as needed.                         [1]   Social History  Socioeconomic History    Marital status: Unknown   Tobacco Use    Smoking status: Never   Substance and Sexual Activity    Alcohol use: Not Currently    Drug use: Never    Sexual activity: Not Currently     Social Drivers of Health     Financial Resource Strain: Patient Declined (2/13/2025)    Received from [facility]    Overall Financial Resource Strain (CARDIA)     Difficulty of Paying Living Expenses: Patient declined   Food Insecurity: Patient Declined (2/13/2025)    Received from [facility]    Hunger Vital Sign     Worried About Running Out of Food in the  Last Year: Patient declined     Ran Out of Food in the Last Year: Patient declined   Transportation Needs: Patient Declined (2/13/2025)    Received from [facility]    PRAPARE - Transportation     Lack of Transportation (Medical): Patient declined     Lack of Transportation (Non-Medical): Patient declined   Physical Activity: Patient Declined (2/13/2025)    Received from [facility]    Exercise Vital Sign     Days of Exercise per Week: Patient declined     Minutes of Exercise per Session: Patient declined   Stress: Patient Declined (2/13/2025)    Received from [facility]    [employer] [place] [employer] - Occupational Stress Questionnaire     Feeling of Stress : Patient declined   Housing Stability: Patient Declined (2/13/2025)    Received from [facility]    Housing Stability Vital Sign     Unable to Pay for Housing in the Last Year: Patient declined     Homeless in the Last Year: Patient declined   [2]   Outpatient Encounter Medications as of 5/22/2025   Medication Sig Dispense Refill    aspirin 81 MG Chew Take by mouth once.      carvediloL (COREG) 6.25 MG tablet Take 1 tablet by mouth 2 (two) times daily.      FLUoxetine (PROZAC) 20 MG capsule Take 20 mg by mouth once daily.      HYDROcodone-acetaminophen (NORCO) 5-325 mg per tablet Take 1 tablet by mouth every 6 (six) hours as needed for Pain. 30 tablet 0    metFORMIN (GLUCOPHAGE) 500 MG tablet Take 1 tablet by mouth 2 (two) times daily with meals.      OZEMPIC 0.25 mg or 0.5 mg (2 mg/3 mL) pen injector Inject 0.5 mg into the skin every 7 days.      propranolol (INDERAL LA) 80 MG 24 hr capsule Take 80 mg by mouth once daily.      rosuvastatin (CRESTOR) 20 MG tablet Take 20 mg by mouth once daily.      tamsulosin (FLOMAX) 0.4 mg Cap Take 1 capsule (0.4 mg total) by mouth 2 (two) times a day. 60 capsule 1    acetaminophen (TYLENOL) 500 MG tablet Take 2 tablets (1,000 mg total) by mouth 3 (three) times daily as needed for Pain. 30  tablet 0    hydrocodone-acetaminophen 5-325mg (NORCO) 5-325 mg per tablet Take 1 tablet by mouth every 4 (four) hours as needed for Pain. 10 tablet 0    ibuprofen (ADVIL,MOTRIN) 200 MG tablet Take 2 tablets (400 mg total) by mouth every 6 (six) hours as needed for Pain. 30 tablet 0     No facility-administered encounter medications on file as of 5/22/2025.

## 2025-06-06 ENCOUNTER — RESULTS FOLLOW-UP (OUTPATIENT)
Facility: CLINIC | Age: 75
End: 2025-06-06

## 2025-06-06 DIAGNOSIS — N20.0 KIDNEY STONE: Primary | ICD-10-CM

## 2025-06-09 ENCOUNTER — PATIENT MESSAGE (OUTPATIENT)
Dept: UROLOGY | Facility: CLINIC | Age: 75
End: 2025-06-09
Payer: MEDICARE

## 2025-06-18 ENCOUNTER — LAB VISIT (OUTPATIENT)
Dept: LAB | Facility: HOSPITAL | Age: 75
End: 2025-06-18
Attending: UROLOGY
Payer: MEDICARE

## 2025-06-18 DIAGNOSIS — N20.0 KIDNEY STONE: ICD-10-CM

## 2025-06-18 LAB — URATE SERPL-MCNC: 7 MG/DL (ref 2.4–5.7)

## 2025-06-18 PROCEDURE — 36415 COLL VENOUS BLD VENIPUNCTURE: CPT

## 2025-06-18 PROCEDURE — 84550 ASSAY OF BLOOD/URIC ACID: CPT

## 2025-06-19 ENCOUNTER — RESULTS FOLLOW-UP (OUTPATIENT)
Facility: CLINIC | Age: 75
End: 2025-06-19

## 2025-06-19 RX ORDER — ALLOPURINOL 300 MG/1
300 TABLET ORAL DAILY
Qty: 90 TABLET | Refills: 1 | Status: SHIPPED | OUTPATIENT
Start: 2025-06-19

## 2025-06-20 ENCOUNTER — TELEPHONE (OUTPATIENT)
Dept: UROLOGY | Facility: CLINIC | Age: 75
End: 2025-06-20
Payer: MEDICARE

## 2025-06-20 NOTE — TELEPHONE ENCOUNTER
Called pt and informed her of Dr. Lyles's message below. Pt was very appreciative and verbalized understanding.  Ashlyn Carballo LPN  - Message from Nando Lyles MD sent at 6/19/2025  3:30 PM CDT -----  Please call Ms Rodriguez and let her know that I reviewed her uric acid level and it was elevated at 7.0 (upper end of normal is 5.7).  I prescribed her a medication called allopurinol 300 mg to take 1   by mouth daily to try to help lower her uric acid level.  A high uric acid level can increase the risk of kidney stones and can cause gout.  Make sure she knows the details of her scheduled KUB,   renal ultrasound, and office appointment with me.  Ask her if she has any questions.  ----- Message -----  From: Lab, Background User  Sent: 6/18/2025   2:37 PM CDT  To: Nando Lyles MD

## 2025-06-20 NOTE — TELEPHONE ENCOUNTER
----- Message from Nando Lyles MD sent at 6/19/2025  3:30 PM CDT -----  Please call Ms Rodriguez and let her know that I reviewed her uric acid level and it was elevated at 7.0 (upper end of normal is 5.7).  I prescribed her a medication called allopurinol 300 mg to take 1   by mouth daily to try to help lower her uric acid level.  A high uric acid level can increase the risk of kidney stones and can cause gout.  Make sure she knows the details of her scheduled KUB,   renal ultrasound, and office appointment with me.  Ask her if she has any questions.  ----- Message -----  From: Lab, Background User  Sent: 6/18/2025   2:37 PM CDT  To: Nando Lyles MD